# Patient Record
Sex: FEMALE | Race: WHITE | URBAN - METROPOLITAN AREA
[De-identification: names, ages, dates, MRNs, and addresses within clinical notes are randomized per-mention and may not be internally consistent; named-entity substitution may affect disease eponyms.]

---

## 2019-09-09 ENCOUNTER — NEW PATIENT (OUTPATIENT)
Dept: URBAN - METROPOLITAN AREA CLINIC 10 | Facility: CLINIC | Age: 29
End: 2019-09-09
Payer: COMMERCIAL

## 2019-09-09 PROCEDURE — 92083 EXTENDED VISUAL FIELD XM: CPT | Performed by: OPTOMETRIST

## 2019-09-09 PROCEDURE — 92004 COMPRE OPH EXAM NEW PT 1/>: CPT | Performed by: OPTOMETRIST

## 2019-09-09 ASSESSMENT — INTRAOCULAR PRESSURE
OS: 19
OD: 19

## 2019-09-09 ASSESSMENT — VISUAL ACUITY
OD: 20/20
OS: 20/20

## 2019-09-09 ASSESSMENT — KERATOMETRY
OS: 42.38
OD: 42.50

## 2020-02-10 ENCOUNTER — NEW PATIENT (OUTPATIENT)
Dept: URBAN - METROPOLITAN AREA CLINIC 10 | Facility: CLINIC | Age: 30
End: 2020-02-10
Payer: COMMERCIAL

## 2020-02-10 DIAGNOSIS — H04.123 TEAR FILM INSUFFICIENCY OF BILATERAL LACRIMAL GLANDS: ICD-10-CM

## 2020-02-10 DIAGNOSIS — H47.10 UNSPECIFIED PAPILLEDEMA: Primary | ICD-10-CM

## 2020-02-10 PROCEDURE — 92012 INTRM OPH EXAM EST PATIENT: CPT | Performed by: OPTOMETRIST

## 2020-02-10 RX ORDER — FLUOROMETHOLONE 1 MG/ML
0.1 % SOLUTION/ DROPS OPHTHALMIC
Qty: 1 | Refills: 1 | Status: ACTIVE
Start: 2020-02-10

## 2020-02-10 ASSESSMENT — INTRAOCULAR PRESSURE
OD: 14
OS: 15

## 2021-06-07 ENCOUNTER — OFFICE VISIT (OUTPATIENT)
Dept: URBAN - METROPOLITAN AREA CLINIC 10 | Facility: CLINIC | Age: 31
End: 2021-06-07
Payer: COMMERCIAL

## 2021-06-07 DIAGNOSIS — H46.8 OTHER OPTIC NEURITIS: Primary | ICD-10-CM

## 2021-06-07 PROCEDURE — 92014 COMPRE OPH EXAM EST PT 1/>: CPT | Performed by: OPTOMETRIST

## 2021-06-07 PROCEDURE — 92134 CPTRZ OPH DX IMG PST SGM RTA: CPT | Performed by: OPTOMETRIST

## 2021-06-07 PROCEDURE — 92133 CPTRZD OPH DX IMG PST SGM ON: CPT | Performed by: OPTOMETRIST

## 2021-06-07 ASSESSMENT — VISUAL ACUITY
OS: 20/25
OD: 20/20

## 2021-06-07 ASSESSMENT — INTRAOCULAR PRESSURE
OS: 19
OD: 19

## 2021-06-07 NOTE — IMPRESSION/PLAN
Impression: Other optic neuritis: H46.8. Plan: Elevated disc OU. By hx, likely pseudotumor cerebri. Mild disc edema persisting OU today, OCT RNFL confirms. Continue care c neurology. RTC 1 year CEE/OCT RNFL.

## 2023-06-16 ENCOUNTER — OFFICE VISIT (OUTPATIENT)
Dept: URBAN - METROPOLITAN AREA CLINIC 10 | Facility: CLINIC | Age: 33
End: 2023-06-16
Payer: COMMERCIAL

## 2023-06-16 DIAGNOSIS — H47.11 PAPILLEDEMA ASSOCIATED WITH INCREASED INTRACRANIAL PRESSURE: Primary | ICD-10-CM

## 2023-06-16 DIAGNOSIS — H10.45 OTHER CHRONIC ALLERGIC CONJUNCTIVITIS: ICD-10-CM

## 2023-06-16 DIAGNOSIS — H11.151 PINGUECULA, RIGHT EYE: ICD-10-CM

## 2023-06-16 DIAGNOSIS — H04.123 TEAR FILM INSUFFICIENCY OF BILATERAL LACRIMAL GLANDS: ICD-10-CM

## 2023-06-16 PROCEDURE — 92014 COMPRE OPH EXAM EST PT 1/>: CPT | Performed by: OPTOMETRIST

## 2023-06-16 PROCEDURE — 92133 CPTRZD OPH DX IMG PST SGM ON: CPT | Performed by: OPTOMETRIST

## 2023-06-16 RX ORDER — FLUOROMETHOLONE 1 MG/ML
0.1 % SUSPENSION/ DROPS OPHTHALMIC
Qty: 1 | Refills: 1 | Status: ACTIVE
Start: 2023-06-16

## 2023-06-16 ASSESSMENT — INTRAOCULAR PRESSURE
OS: 14
OD: 14

## 2023-06-16 ASSESSMENT — KERATOMETRY
OS: 42.38
OD: 42.63

## 2023-06-16 ASSESSMENT — VISUAL ACUITY
OD: 20/20
OS: 20/20

## 2023-06-16 NOTE — IMPRESSION/PLAN
Impression: Other chronic allergic conjunctivitis: H10.45. Plan: Discussed findings, 
Start 
cool compresses Pataday QD OU
AT's QID FML BID OU  x 2 weeks.

## 2023-06-16 NOTE — IMPRESSION/PLAN
Impression: Tear film insufficiency of bilateral lacrimal glands Plan: Recommend artificial tears at least 4 times a day and gel drop or tear ointment at bedtime, Omega 3 fatty acids (2-3,000 mg) daily.   monitor

## 2023-06-16 NOTE — IMPRESSION/PLAN
Impression: Pinguecula, right eye: H11.151. Plan: Patient educated. Recommend artificial tears QID or PRN OU and UV protection.

## 2023-06-16 NOTE — IMPRESSION/PLAN
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 Plan: Appearance of mildly elevated disc OD and OS, no progression over 2 years ago. OCT is overall stable to previous year, mild thickening with minimal cupping. Pt reports that intracranial pressure has been mildly elevated and has been treated with diamox or other diuretics. Pt states she has had lumbar puncture with mildly elevated ICP (though also has been told it is not elevated). I assume pt has been diagnosed with pseudotumor cerebri. However, pt states that she has had mixed accounts. She has seen both neurology and neuro-ophthalmology. She states she has been tested for MS. Etiology is unclear, whether she has intracranial pressure elevation or not is also unclear. I have not received outside notes. I discussed that without some of this vital information, I do not have a complete view of the case. I also discussed that both neurology and neuro-ophthalmology have a higher level of expertise in this topic. Continue care c neuro-ophthalmology, last saw 1 mo ago, seeing every few months. Discussed with patient that I am able to attempt to relieve symptoms of allergic conjunctivitis, but her range of symptoms cannot all be from allergic conjunctivitis alone. Notes to PCP, neurology and neuro-ophthalmology. Lengthy discussion. RTC 1 year CEE c possible HVF 30-2.